# Patient Record
Sex: MALE | Race: WHITE | Employment: OTHER | URBAN - METROPOLITAN AREA
[De-identification: names, ages, dates, MRNs, and addresses within clinical notes are randomized per-mention and may not be internally consistent; named-entity substitution may affect disease eponyms.]

---

## 2017-05-11 ENCOUNTER — HOSPITAL ENCOUNTER (OUTPATIENT)
Dept: PHYSICAL THERAPY | Age: 77
Discharge: HOME | End: 2017-05-31
Admitting: ORTHOPAEDIC SURGERY

## 2017-05-16 ENCOUNTER — HOSPITAL ENCOUNTER (OUTPATIENT)
Dept: PHYSICAL THERAPY | Age: 77
Discharge: HOME | End: 2017-05-16
Admitting: ORTHOPAEDIC SURGERY

## 2017-05-18 ENCOUNTER — HOSPITAL ENCOUNTER (OUTPATIENT)
Dept: PHYSICAL THERAPY | Age: 77
Discharge: HOME | End: 2017-05-18
Admitting: ORTHOPAEDIC SURGERY

## 2017-05-24 ENCOUNTER — HOSPITAL ENCOUNTER (OUTPATIENT)
Dept: PHYSICAL THERAPY | Age: 77
Discharge: HOME | End: 2017-05-24
Admitting: ORTHOPAEDIC SURGERY

## 2017-07-11 LAB
A/G RATIO: 1.7 (ref 1.1–2.2)
ALBUMIN SERPL-MCNC: 4.3 G/DL (ref 3.4–5)
ALP BLD-CCNC: 73 U/L (ref 40–129)
ALT SERPL-CCNC: 22 U/L (ref 10–40)
ANION GAP SERPL CALCULATED.3IONS-SCNC: 15 MMOL/L (ref 3–16)
AST SERPL-CCNC: 29 U/L (ref 15–37)
BASOPHILS ABSOLUTE: 0.1 K/UL (ref 0–0.2)
BASOPHILS RELATIVE PERCENT: 1.2 %
BILIRUB SERPL-MCNC: 0.7 MG/DL (ref 0–1)
BUN BLDV-MCNC: 21 MG/DL (ref 7–20)
CALCIUM SERPL-MCNC: 9.4 MG/DL (ref 8.3–10.6)
CHLORIDE BLD-SCNC: 103 MMOL/L (ref 99–110)
CHOLESTEROL, TOTAL: 183 MG/DL (ref 0–199)
CO2: 24 MMOL/L (ref 21–32)
CREAT SERPL-MCNC: 0.9 MG/DL (ref 0.8–1.3)
EOSINOPHILS ABSOLUTE: 0.6 K/UL (ref 0–0.6)
EOSINOPHILS RELATIVE PERCENT: 7.5 %
FOLATE: >20 NG/ML (ref 4.78–24.2)
GFR AFRICAN AMERICAN: >60
GFR NON-AFRICAN AMERICAN: >60
GLOBULIN: 2.6 G/DL
GLUCOSE BLD-MCNC: 94 MG/DL (ref 70–99)
HCT VFR BLD CALC: 44.2 % (ref 40.5–52.5)
HDLC SERPL-MCNC: 61 MG/DL (ref 40–60)
HEMOGLOBIN: 14.8 G/DL (ref 13.5–17.5)
LDL CHOLESTEROL CALCULATED: 90 MG/DL
LYMPHOCYTES ABSOLUTE: 2.3 K/UL (ref 1–5.1)
LYMPHOCYTES RELATIVE PERCENT: 27.5 %
MCH RBC QN AUTO: 33 PG (ref 26–34)
MCHC RBC AUTO-ENTMCNC: 33.6 G/DL (ref 31–36)
MCV RBC AUTO: 98.4 FL (ref 80–100)
MONOCYTES ABSOLUTE: 0.9 K/UL (ref 0–1.3)
MONOCYTES RELATIVE PERCENT: 11.2 %
NEUTROPHILS ABSOLUTE: 4.5 K/UL (ref 1.7–7.7)
NEUTROPHILS RELATIVE PERCENT: 52.6 %
PDW BLD-RTO: 13.9 % (ref 12.4–15.4)
PLATELET # BLD: 179 K/UL (ref 135–450)
PMV BLD AUTO: 10.1 FL (ref 5–10.5)
POTASSIUM SERPL-SCNC: 4.5 MMOL/L (ref 3.5–5.1)
RBC # BLD: 4.49 M/UL (ref 4.2–5.9)
SODIUM BLD-SCNC: 142 MMOL/L (ref 136–145)
TOTAL PROTEIN: 6.9 G/DL (ref 6.4–8.2)
TRIGL SERPL-MCNC: 161 MG/DL (ref 0–150)
TSH SERPL DL<=0.05 MIU/L-ACNC: 6.99 UIU/ML (ref 0.27–4.2)
URIC ACID, SERUM: 7.8 MG/DL (ref 3.5–7.2)
VITAMIN B-12: 372 PG/ML (ref 211–911)
VITAMIN D 25-HYDROXY: 42.3 NG/ML
VLDLC SERPL CALC-MCNC: 32 MG/DL
WBC # BLD: 8.5 K/UL (ref 4–11)

## 2018-05-17 PROBLEM — E03.9 HYPOTHYROIDISM: Status: ACTIVE | Noted: 2018-05-17

## 2018-05-17 PROBLEM — J18.9 CAP (COMMUNITY ACQUIRED PNEUMONIA): Status: ACTIVE | Noted: 2018-05-17

## 2018-05-17 PROBLEM — G25.81 RESTLESS LEG SYNDROME: Status: ACTIVE | Noted: 2018-05-17

## 2018-05-17 PROBLEM — J16.0 CAP (COMMUNITY ACQUIRED PNEUMONIA) DUE TO CHLAMYDIA SPECIES: Status: ACTIVE | Noted: 2018-05-17

## 2018-05-17 PROBLEM — I10 HYPERTENSION: Chronic | Status: ACTIVE | Noted: 2018-05-17

## 2018-05-17 PROBLEM — J45.909 ASTHMA: Status: ACTIVE | Noted: 2018-05-17

## 2018-05-17 PROBLEM — R07.89 ATYPICAL CHEST PAIN: Status: ACTIVE | Noted: 2018-05-17

## 2018-05-17 PROBLEM — J16.0 CAP (COMMUNITY ACQUIRED PNEUMONIA) DUE TO CHLAMYDIA SPECIES: Status: RESOLVED | Noted: 2018-05-17 | Resolved: 2018-05-17

## 2018-05-24 ENCOUNTER — HOSPITAL ENCOUNTER (OUTPATIENT)
Dept: OTHER | Age: 78
Discharge: HOME | End: 2018-05-24
Attending: INTERNAL MEDICINE | Admitting: INTERNAL MEDICINE

## 2018-05-24 DIAGNOSIS — G00.1: ICD-10-CM

## 2018-06-07 ENCOUNTER — HOSPITAL ENCOUNTER (OUTPATIENT)
Dept: NON INVASIVE DIAGNOSTICS | Age: 78
Discharge: HOME | End: 2018-06-07
Attending: INTERNAL MEDICINE | Admitting: INTERNAL MEDICINE

## 2018-06-07 DIAGNOSIS — R07.9 CHEST PAIN: ICD-10-CM

## 2018-06-07 LAB
LV EF: 78 %
LVEF MODALITY: NORMAL

## 2018-07-03 ENCOUNTER — HOSPITAL ENCOUNTER (OUTPATIENT)
Dept: CT IMAGING | Age: 78
Discharge: HOME | End: 2018-07-03
Attending: INTERNAL MEDICINE | Admitting: INTERNAL MEDICINE

## 2018-07-03 DIAGNOSIS — R06.02 BREATH SHORTNESS: ICD-10-CM

## 2018-07-03 DIAGNOSIS — R06.02 SHORTNESS OF BREATH: ICD-10-CM

## 2021-08-28 ENCOUNTER — HOSPITAL ENCOUNTER (EMERGENCY)
Age: 81
Discharge: HOME | End: 2021-08-28
Payer: MEDICARE

## 2021-08-28 ENCOUNTER — APPOINTMENT (OUTPATIENT)
Dept: CT IMAGING | Age: 81
End: 2021-08-28
Payer: MEDICARE

## 2021-08-28 VITALS
WEIGHT: 192 LBS | HEIGHT: 66 IN | BODY MASS INDEX: 30.86 KG/M2 | DIASTOLIC BLOOD PRESSURE: 59 MMHG | HEART RATE: 85 BPM | RESPIRATION RATE: 18 BRPM | SYSTOLIC BLOOD PRESSURE: 139 MMHG | OXYGEN SATURATION: 95 % | TEMPERATURE: 97.5 F

## 2021-08-28 DIAGNOSIS — S01.81XA FACIAL LACERATION, INITIAL ENCOUNTER: ICD-10-CM

## 2021-08-28 DIAGNOSIS — S02.2XXB OPEN FRACTURE OF NASAL BONE, INITIAL ENCOUNTER: ICD-10-CM

## 2021-08-28 DIAGNOSIS — S09.90XA INJURY OF HEAD, INITIAL ENCOUNTER: Primary | ICD-10-CM

## 2021-08-28 PROCEDURE — 99283 EMERGENCY DEPT VISIT LOW MDM: CPT

## 2021-08-28 PROCEDURE — 70450 CT HEAD/BRAIN W/O DYE: CPT

## 2021-08-28 PROCEDURE — 6360000002 HC RX W HCPCS: Performed by: PHYSICIAN ASSISTANT

## 2021-08-28 PROCEDURE — 90715 TDAP VACCINE 7 YRS/> IM: CPT | Performed by: PHYSICIAN ASSISTANT

## 2021-08-28 PROCEDURE — 12013 RPR F/E/E/N/L/M 2.6-5.0 CM: CPT

## 2021-08-28 PROCEDURE — 90471 IMMUNIZATION ADMIN: CPT | Performed by: PHYSICIAN ASSISTANT

## 2021-08-28 PROCEDURE — 96372 THER/PROPH/DIAG INJ SC/IM: CPT

## 2021-08-28 PROCEDURE — 72125 CT NECK SPINE W/O DYE: CPT

## 2021-08-28 RX ORDER — LIDOCAINE HYDROCHLORIDE 10 MG/ML
INJECTION, SOLUTION EPIDURAL; INFILTRATION; INTRACAUDAL; PERINEURAL
Status: DISCONTINUED
Start: 2021-08-28 | End: 2021-08-28 | Stop reason: HOSPADM

## 2021-08-28 RX ORDER — CEPHALEXIN 500 MG/1
500 CAPSULE ORAL 4 TIMES DAILY
Qty: 40 CAPSULE | Refills: 0 | Status: SHIPPED | OUTPATIENT
Start: 2021-08-28

## 2021-08-28 RX ORDER — CEFAZOLIN SODIUM 1 G/3ML
1000 INJECTION, POWDER, FOR SOLUTION INTRAMUSCULAR; INTRAVENOUS ONCE
Status: COMPLETED | OUTPATIENT
Start: 2021-08-28 | End: 2021-08-28

## 2021-08-28 RX ADMIN — CEFAZOLIN SODIUM 1000 MG: 1 INJECTION, POWDER, FOR SOLUTION INTRAMUSCULAR; INTRAVENOUS at 13:38

## 2021-08-28 RX ADMIN — TETANUS TOXOID, REDUCED DIPHTHERIA TOXOID AND ACELLULAR PERTUSSIS VACCINE, ADSORBED 0.5 ML: 5; 2.5; 8; 8; 2.5 SUSPENSION INTRAMUSCULAR at 10:50

## 2021-08-28 ASSESSMENT — ENCOUNTER SYMPTOMS
NAUSEA: 0
COLOR CHANGE: 0
ABDOMINAL PAIN: 0
SHORTNESS OF BREATH: 0
EYE PAIN: 0
CONSTIPATION: 0
DIARRHEA: 0
PHOTOPHOBIA: 0
VOMITING: 0

## 2021-08-28 NOTE — ED PROVIDER NOTES
**EVALUATED BY OTILIA**    [address] [facility]  eMERGENCY dEPARTMENT eNCOUnter    Pt Name: Ashkan Elliott  MRN: [id number]  [place] 1940  Date of evaluation: 8/28/2021  Provider: LILA Cristina    Chief Complaint:    Chief Complaint   Patient presents with   Benita Me Fall     pt came in d/t falling and hit forehead. Small laceration. A&O. No LOC     Nursing Notes, Past Medical Hx, Past Surgical Hx, Social Hx, Allergies, and Family Hx were all reviewed and agreedwith or any disagreements were addressed in the HPI.    HPI:  (Location, Duration, Timing, Severity, Quality, Assoc Sx, Context, Modifying factors)  This is a  78 y.o. male who presents to the emergency department with complaints of having a mechanical fall, states that he was walking up the stairs carrying a cooler and some other items and tripped over a step hitting his head. He has a laceration to the middle of his forehead and 2 separately ulcerations on his nose. He denies any loss of consciousness. He does take an 81 mg aspirin daily. Denies any confusion or neck pain. Denies numbness tingling or focal weakness of upper or lower extremities or difficulty with speech or gait. No visual changes. No aggravating or alleviating factors. Denies pain. No radiation of symptoms. Denies all other complaints including chest pain, shortness of breath abdominal pain, vomiting or urinary symptoms. All other systems were reviewed and are negative.      Past Medical/Surgical History:      Diagnosis Date    Asthma     Hypertension     Thyroid disease          Procedure Laterality Date    TOTAL KNEE ARTHROPLASTY Left 9/10/2015       Medications:  Discharge Medication List as of 8/28/2021  1:38 PM      CONTINUE these medications which have NOT CHANGED    Details   albuterol (PROVENTIL) (2.5 MG/3ML) 0.083% nebulizer solution Take 3 mLs by nebulization every 4 hours as needed for Wheezing or Shortness of Breath, Disp-120 each, R-0Normal      !! gabapentin (NEURONTIN) 100 MG capsule Take 100 mg by mouth daily. Jason Haile Historical Med      levothyroxine (SYNTHROID) 125 MCG tablet Take 125 mcg by mouth DailyHistorical Med      magnesium oxide (MAG-OX) 400 MG tablet Take 400 mg by mouth dailyHistorical Med      Cholecalciferol (VITAMIN D) 2000 UNITS CAPS capsule Take 2,000 Units by mouth daily      ascorbic acid (VITAMIN C) 500 MG tablet Take 500 mg by mouth daily      allopurinol (ZYLOPRIM) 100 MG tablet Take 100 mg by mouth       rOPINIRole (REQUIP) 0.5 MG tablet Take 0.5 mg by mouth nightly Historical Med      !! gabapentin (NEURONTIN) 100 MG capsule Take 200 mg by mouth every morning (before breakfast). .Historical Med      Multiple Vitamin TABS Take 1 tablet by mouth daily Historical Med      amLODIPine (NORVASC) 5 MG tablet Take 5 mg by mouth daily. aspirin 81 MG tablet Take 81 mg by mouth daily. montelukast (SINGULAIR) 10 MG tablet Take 10 mg by mouth nightly. hydrochlorothiazide (MICROZIDE) 12.5 MG capsule Take 12.5 mg by mouth daily. esomeprazole (NEXIUM) 20 MG capsule Take 20 mg by mouth daily Historical Med      budesonide-formoterol (SYMBICORT) 160-4.5 MCG/ACT AERO Inhale 2 puffs into the lungs 2 times daily. albuterol (PROVENTIL HFA) 108 (90 BASE) MCG/ACT inhaler Inhale 2 puffs into the lungs every 6 hours as needed for Wheezing., Disp-1 Inhaler, R-1       !! - Potential duplicate medications found. Please discuss with provider. Review of Systems:  Review of Systems   Constitutional: Negative for fever. Eyes: Negative for photophobia, pain and visual disturbance. Respiratory: Negative for shortness of breath. Cardiovascular: Negative for chest pain. Gastrointestinal: Negative for abdominal pain, constipation, diarrhea, nausea and vomiting. Musculoskeletal: Negative for neck pain and neck stiffness. Skin: Positive for wound. Negative for color change and rash.    Neurological: Negative for dizziness, seizures, syncope, speech difficulty, weakness, light-headedness, numbness and headaches. Psychiatric/Behavioral: Negative for confusion. All other systems reviewed and are negative. Positives and Pertinent negatives as per HPI. Except as noted above in the ROS, all other systems were reviewed/completed and are negative. Physical Exam:  Physical Exam  Vitals and nursing note reviewed. Constitutional:       Appearance: Normal appearance. He is well-developed. He is not toxic-appearing or diaphoretic. HENT:      Head: Normocephalic. Right Ear: External ear normal.      Left Ear: External ear normal.      Nose: Nose normal.   Eyes:      General:         Right eye: No discharge. Left eye: No discharge. Conjunctiva/sclera: Conjunctivae normal.      Pupils: Pupils are equal, round, and reactive to light. Neck:      Trachea: Phonation normal.      Meningeal: Brudzinski's sign and Kernig's sign absent. Cardiovascular:      Rate and Rhythm: Normal rate and regular rhythm. Heart sounds: Normal heart sounds. No murmur heard. No friction rub. No gallop. Pulmonary:      Effort: Pulmonary effort is normal. No respiratory distress. Breath sounds: Normal breath sounds. No stridor. No wheezing or rales. Musculoskeletal:         General: Normal range of motion. Cervical back: Full passive range of motion without pain, normal range of motion and neck supple. No rigidity. No spinous process tenderness or muscular tenderness. Normal range of motion. Skin:     General: Skin is warm and dry. Coloration: Skin is not pale. Neurological:      Mental Status: He is alert and oriented to person, place, and time. He is not disoriented. GCS: GCS eye subscore is 4. GCS verbal subscore is 5. GCS motor subscore is 6. Cranial Nerves: No cranial nerve deficit. Sensory: No sensory deficit.       Coordination: Coordination normal.      Comments: Negative FINDINGS: BRAIN/VENTRICLES: There is no acute intracerebral hemorrhage or extra-axial fluid collection. There is moderate cerebral atrophy ORBITS: Status post bilateral cataract removal. SINUSES: Mild mucosal hypertrophy involves the bilateral ethmoid air cells and left frontal sinus. SOFT TISSUES/SKULL:  The calvarium is intact. There are age-indeterminate bilateral nasal bone fractures. There is a small left frontal scalp laceration. 1. No acute intracranial abnormality. 2. Age-indeterminate bilateral nasal bone fractures. CT CERVICAL SPINE WO CONTRAST    Result Date: 8/28/2021  EXAMINATION: CT OF THE CERVICAL SPINE WITHOUT CONTRAST 8/28/2021 12:31 pm: TECHNIQUE: CT of the cervical spine was performed without the administration of intravenous contrast. Multiplanar reformatted images are provided for review. Dose modulation, iterative reconstruction, and/or weight based adjustment of the mA/kV was utilized to reduce the radiation dose to as low as reasonably achievable. COMPARISON: None available. HISTORY: ORDERING SYSTEM PROVIDED HISTORY: injury TECHNOLOGIST PROVIDED HISTORY: Reason for exam:->injury Decision Support Exception - unselect if not a suspected or confirmed emergency medical condition->Emergency Medical Condition (MA) Reason for Exam: injury Acuity: Acute Type of Exam: Initial FINDINGS: BONES/ALIGNMENT: There is no acute fracture. The bones are demineralized. The 7 cervical vertebral bodies are in anatomic alignment. The craniocervical junction is intact. DEGENERATIVE CHANGES: There is moderate to severe multilevel spondylosis and facet arthropathy. SOFT TISSUES: There is no prevertebral soft tissue swelling. There is biapical scarring with mild to moderate emphysema. 1.  No acute fracture.         MEDICAL DECISION MAKING / ED COURSE:      PROCEDURES:   Lac Repair    Date/Time: 8/28/2021 1:42 PM  Performed by: LILA Villagran  Authorized by: LILA Villagran     Consent: Consent obtained:  Verbal    Consent given by:  Patient    Risks discussed:  Pain, poor cosmetic result, poor wound healing and infection  Anesthesia (see MAR for exact dosages): Anesthesia method:  Local infiltration    Local anesthetic:  Lidocaine 1% WITH epi  Laceration details:     Location:  Face    Facial location: midline forhead, nose. Wound length (cm): forhead 2 cm, nose two lacerations 1 cm each. Repair type:     Repair type:  Simple  Pre-procedure details:     Preparation:  Patient was prepped and draped in usual sterile fashion  Exploration:     Hemostasis achieved with:  Epinephrine    Wound extent: no underlying fracture noted and no vascular damage noted      Contaminated: no    Treatment:     Area cleansed with:  Saline and Hibiclens    Amount of cleaning:  Extensive    Irrigation solution:  Sterile saline  Skin repair:     Repair method:  Sutures    Suture size:  5-0    Suture material:  Nylon    Suture technique:  Simple interrupted    Number of sutures: Forehead 4 sutures, nose 2 sutures. Approximation:     Approximation:  Close  Post-procedure details:     Patient tolerance of procedure: Tolerated well, no immediate complications        Patient was given:  Medications   lidocaine-EPINEPHrine 1 percent-[id number] injection 20 mL (has no administration in time range)   lidocaine PF 1 % injection (has no administration in time range)   sterile water injection (has no administration in time range)   Tetanus-Diphth-Acell Pertussis (BOOSTRIX) injection 0.5 mL (0.5 mLs IntraMUSCular Given 8/28/21 1050)   ceFAZolin (ANCEF) injection 1,000 mg (1,000 mg IntraMUSCular Given 8/28/21 1338)   This is a  78 y.o. male who presents to the emergency department with complaints of having a mechanical fall, states that he was walking up the stairs carrying a cooler and some other items and tripped over a step hitting his head.   He has a laceration to the middle of his forehead and 2 separately ulcerations on his nose. He denies any loss of consciousness. He does take an 81 mg aspirin daily. Denies any confusion or neck pain. Denies numbness tingling or focal weakness of upper or lower extremities or difficulty with speech or gait. No visual changes. No aggravating or alleviating factors. Denies pain. No radiation of symptoms. Denies all other complaints including chest pain, shortness of breath abdominal pain, vomiting or urinary symptoms. Does have evidence of nasal bone fractures bilaterally, with the laceration I will consider this an open fracture and given Ancef here in the ER and a prescription for Keflex sent to his home pharmacy. Patient tolerated procedure well and in total required 6 sutures with good approximation. Tetanus was updated in ED. CT cervical spine showed no evidence of acute fracture. CT head shows no evidence of abnormalities. Head injury precautions for home. Outpatient follow-up with ENT. Patient verbally agreeable with this plan of care and otherwise well-appearing at time of discharge. The patient tolerated their visit well. I have evaluated the patient with physician available for consultation as needed. I have discussed the findings of today's workup with the patient and addressed the patient's questions and concerns. Important warning signs as well as new or worsening symptoms which wouldnecessitate immediate return to the ED were discussed. The plan is to discharge from the ED at this time, and the patient is in stable condition. The patient acknowledged understanding is agreeable with this plan. CLINICAL IMPRESSION:  1. Injury of head, initial encounter    2. Facial laceration, initial encounter    3.  Open fracture of nasal bone, initial encounter        DISPOSITION Decision To Discharge 08/28/2021 01:31:50 PM      PATIENT REFERRED TO:  Lazaro Kenney, [address] [place] [place] [id number]  [phone]    Schedule an appointment as soon as possible for a visit   ENT - for nasal bone fractures.     [facility] Emergency Department  [address] [facility]  [address] [facility] [address] [facility]  Go to   If symptoms worsen    [id number]  [address] [place]  [phone]    Schedule an appointment as soon as possible for a visit   For suture removal in 5 days      DISCHARGE MEDICATIONS:  Discharge Medication List as of 8/28/2021  1:38 PM      START taking these medications    Details   cephALEXin (KEFLEX) 500 MG capsule Take 1 capsule by mouth 4 times daily, Disp-40 capsule, R-0Normal                    (Please note the MDM and HPI sections of this note were completed with avoice recognition program.  Efforts were made to edit the dictations but occasionally words are mis-transcribed.)    Electronically signed, LILA Spears,            LILA Jarrett  08/28/21 [id number]